# Patient Record
Sex: FEMALE | Race: WHITE | NOT HISPANIC OR LATINO | Employment: STUDENT | URBAN - METROPOLITAN AREA
[De-identification: names, ages, dates, MRNs, and addresses within clinical notes are randomized per-mention and may not be internally consistent; named-entity substitution may affect disease eponyms.]

---
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
## 2024-12-19 ENCOUNTER — PATIENT MESSAGE (OUTPATIENT)
Dept: INTERNAL MEDICINE | Facility: CLINIC | Age: 19
End: 2024-12-19

## 2024-12-19 ENCOUNTER — OFFICE VISIT (OUTPATIENT)
Dept: INTERNAL MEDICINE | Facility: CLINIC | Age: 19
End: 2024-12-19
Payer: COMMERCIAL

## 2024-12-19 ENCOUNTER — LAB VISIT (OUTPATIENT)
Dept: LAB | Facility: OTHER | Age: 19
End: 2024-12-19
Attending: FAMILY MEDICINE
Payer: COMMERCIAL

## 2024-12-19 VITALS
SYSTOLIC BLOOD PRESSURE: 120 MMHG | HEIGHT: 65 IN | OXYGEN SATURATION: 98 % | HEART RATE: 63 BPM | BODY MASS INDEX: 27.25 KG/M2 | DIASTOLIC BLOOD PRESSURE: 70 MMHG | WEIGHT: 163.56 LBS

## 2024-12-19 DIAGNOSIS — Z00.00 ANNUAL PHYSICAL EXAM: Primary | ICD-10-CM

## 2024-12-19 DIAGNOSIS — R61 EXCESSIVE SWEATING: ICD-10-CM

## 2024-12-19 DIAGNOSIS — Z00.00 ANNUAL PHYSICAL EXAM: ICD-10-CM

## 2024-12-19 LAB
ALBUMIN SERPL BCP-MCNC: 4.3 G/DL (ref 3.5–5.2)
ALP SERPL-CCNC: 52 U/L (ref 40–150)
ALT SERPL W/O P-5'-P-CCNC: 26 U/L (ref 10–44)
ANION GAP SERPL CALC-SCNC: 7 MMOL/L (ref 8–16)
AST SERPL-CCNC: 20 U/L (ref 10–40)
BASOPHILS # BLD AUTO: 0.06 K/UL (ref 0–0.2)
BASOPHILS NFR BLD: 0.7 % (ref 0–1.9)
BILIRUB SERPL-MCNC: 0.5 MG/DL (ref 0.1–1)
BUN SERPL-MCNC: 9 MG/DL (ref 6–20)
CALCIUM SERPL-MCNC: 9.6 MG/DL (ref 8.7–10.5)
CHLORIDE SERPL-SCNC: 108 MMOL/L (ref 95–110)
CHOLEST SERPL-MCNC: 167 MG/DL (ref 120–199)
CHOLEST/HDLC SERPL: 3.3 {RATIO} (ref 2–5)
CO2 SERPL-SCNC: 23 MMOL/L (ref 23–29)
CREAT SERPL-MCNC: 1 MG/DL (ref 0.5–1.4)
DIFFERENTIAL METHOD BLD: ABNORMAL
EOSINOPHIL # BLD AUTO: 0.4 K/UL (ref 0–0.5)
EOSINOPHIL NFR BLD: 5.1 % (ref 0–8)
ERYTHROCYTE [DISTWIDTH] IN BLOOD BY AUTOMATED COUNT: 11.2 % (ref 11.5–14.5)
EST. GFR  (NO RACE VARIABLE): >60 ML/MIN/1.73 M^2
GLUCOSE SERPL-MCNC: 90 MG/DL (ref 70–110)
HCT VFR BLD AUTO: 40.3 % (ref 37–48.5)
HDLC SERPL-MCNC: 51 MG/DL (ref 40–75)
HDLC SERPL: 30.5 % (ref 20–50)
HGB BLD-MCNC: 13.8 G/DL (ref 12–16)
IMM GRANULOCYTES # BLD AUTO: 0.01 K/UL (ref 0–0.04)
IMM GRANULOCYTES NFR BLD AUTO: 0.1 % (ref 0–0.5)
LDLC SERPL CALC-MCNC: 102 MG/DL (ref 63–159)
LYMPHOCYTES # BLD AUTO: 4.1 K/UL (ref 1–4.8)
LYMPHOCYTES NFR BLD: 51 % (ref 18–48)
MCH RBC QN AUTO: 31.4 PG (ref 27–31)
MCHC RBC AUTO-ENTMCNC: 34.2 G/DL (ref 32–36)
MCV RBC AUTO: 92 FL (ref 82–98)
MONOCYTES # BLD AUTO: 0.5 K/UL (ref 0.3–1)
MONOCYTES NFR BLD: 6.1 % (ref 4–15)
NEUTROPHILS # BLD AUTO: 3 K/UL (ref 1.8–7.7)
NEUTROPHILS NFR BLD: 37 % (ref 38–73)
NONHDLC SERPL-MCNC: 116 MG/DL
NRBC BLD-RTO: 0 /100 WBC
PLATELET # BLD AUTO: 223 K/UL (ref 150–450)
PMV BLD AUTO: 10.5 FL (ref 9.2–12.9)
POTASSIUM SERPL-SCNC: 4.8 MMOL/L (ref 3.5–5.1)
PROT SERPL-MCNC: 7.2 G/DL (ref 6–8.4)
RBC # BLD AUTO: 4.4 M/UL (ref 4–5.4)
SODIUM SERPL-SCNC: 138 MMOL/L (ref 136–145)
T4 FREE SERPL-MCNC: 0.92 NG/DL (ref 0.71–1.51)
TRIGL SERPL-MCNC: 70 MG/DL (ref 30–150)
TSH SERPL DL<=0.005 MIU/L-ACNC: 1.42 UIU/ML (ref 0.4–4)
WBC # BLD AUTO: 8.08 K/UL (ref 3.9–12.7)

## 2024-12-19 PROCEDURE — 80061 LIPID PANEL: CPT | Performed by: FAMILY MEDICINE

## 2024-12-19 PROCEDURE — 84439 ASSAY OF FREE THYROXINE: CPT | Performed by: FAMILY MEDICINE

## 2024-12-19 PROCEDURE — 36415 COLL VENOUS BLD VENIPUNCTURE: CPT | Performed by: FAMILY MEDICINE

## 2024-12-19 PROCEDURE — 99999 PR PBB SHADOW E&M-EST. PATIENT-LVL III: CPT | Mod: PBBFAC,,, | Performed by: FAMILY MEDICINE

## 2024-12-19 PROCEDURE — 85025 COMPLETE CBC W/AUTO DIFF WBC: CPT | Performed by: FAMILY MEDICINE

## 2024-12-19 PROCEDURE — 80053 COMPREHEN METABOLIC PANEL: CPT | Performed by: FAMILY MEDICINE

## 2024-12-19 PROCEDURE — 84443 ASSAY THYROID STIM HORMONE: CPT | Performed by: FAMILY MEDICINE

## 2024-12-19 RX ORDER — NORETHINDRONE ACETATE AND ETHINYL ESTRADIOL 1MG-20(21)
1 KIT ORAL DAILY
COMMUNITY

## 2024-12-19 NOTE — PROGRESS NOTES
Subjective:      Patient ID: Sarah Hooper is a 19 y.o. female.    Chief Complaint: Thyroid Problem and Est Care (Not sleeping )    History of Present Illness    CHIEF COMPLAINT:  - Ms. Hooper presents for an annual physical exam and to discuss concerns about possible thyroid issues.    HPI:  Ms. Hooper reports increased sweating over the past 3-4 weeks, occurring throughout the day, even in comfortable temperatures. She describes an incident of profuse sweating while in an air-conditioned vehicle. Ms. Hooper also has night sweats. These episodes are not associated with feeling hot, tachycardia, flushing, or anxiety.    Ms. Hooper has difficulty falling asleep and maintaining sleep, often requiring melatonin to initiate sleep. She has frequent nocturnal awakenings.    Ms. Hooper reports chronic mild constipation. She denies recent changes in weight, hair thinning, or eyebrow thinning. Ms. Hooper also denies depression or increased anxiety recently.    Ms. Hooper reports generally regular menstrual periods. She notes an absence of menstruation during the placebo week of her oral contraceptive 2 months ago. Her most recent menstrual period occurred as expected. She is currently on oral contraceptives (Blisabi ) taken consistently for 3 weeks followed by a 1-week break for menstruation. She has not recently altered her contraceptive regimen.    Ms. Hooper denies cough, fever, mood changes, or any recent illnesses. She denies having been diagnosed with any medical issues, including asthma and cardiac conditions.    She's in nursing school; she had a TB test (routine for work) that was negative in Aug 2025.    MEDICAL HISTORY:  - Contraception: Current oral birth control (Blisabi F.E. 120)  - Sexually active: Yes  - Pregnancy status: Denies possibility of pregnancy  - Flu shot received in September 2023    FAMILY HISTORY:  - Mother: History of cancer (Hodgkin's or non-Hodgkin's lymphoma),  hypothyroidism  - Father: History of cancer (Hodgkin's or non-Hodgkin's lymphoma), hypertension    SOCIAL HISTORY:  - Alcohol Use: Rarely, a few times per year  - Smoking: No  - Chewing tobacco: No  - Illicit drugs: No  - Occupation: Nursing student         There is no problem list on file for this patient.         Current Outpatient Medications:     norethindrone-ethinyl estradiol (BLISOVI FE 1/20, 28,) 1 mg-20 mcg (21)/75 mg (7) per tablet, Take 1 tablet by mouth once daily., Disp: , Rfl:     History reviewed. No pertinent surgical history.     Family History   Problem Relation Name Age of Onset    Other (blood cancer) Mother      Hypothyroidism Mother      Hypertension Father      Other (blood cancer) Father      Breast cancer Neg Hx      Colon cancer Neg Hx      Ovarian cancer Neg Hx          Social History     Socioeconomic History    Marital status: Single   Occupational History    Occupation: nursing student   Tobacco Use    Smoking status: Never    Smokeless tobacco: Never   Substance and Sexual Activity    Alcohol use: No     Comment: rarely    Drug use: No    Sexual activity: Never     Partners: Male     Social Drivers of Health     Financial Resource Strain: Low Risk  (12/17/2024)    Overall Financial Resource Strain (CARDIA)     Difficulty of Paying Living Expenses: Not hard at all   Food Insecurity: No Food Insecurity (12/17/2024)    Hunger Vital Sign     Worried About Running Out of Food in the Last Year: Never true     Ran Out of Food in the Last Year: Never true   Physical Activity: Sufficiently Active (12/17/2024)    Exercise Vital Sign     Days of Exercise per Week: 4 days     Minutes of Exercise per Session: 60 min   Stress: No Stress Concern Present (12/17/2024)    [employer] - Occupational Stress Questionnaire     Feeling of Stress : Only a little   Housing Stability: Unknown (12/17/2024)    Housing Stability Vital Sign     Unable to Pay for Housing in the Last  "Year: No        No results found for: "NA", "K", "CL", "CO2", "GLU", "BUN", "CREATININE", "CALCIUM", "PROT", "ALBUMIN", "BILITOT", "ALKPHOS", "AST", "ALT", "ANIONGAP", "ESTGFRAFRICA", "EGFRNONAA", "WBC", "HGB", "HCT", "MCV", "PLT", "TSH", "PSA", "PSADIAG", "HEPCAB"    No results found for: "HGBA1C"  No results found for: "MICALBCREAT"  No results found for: "LDLCALC", "CHOL", "HDL", "TRIG"    Review of Systems   Constitutional:  Positive for diaphoresis. Negative for appetite change, chills, fever and unexpected weight change.   HENT:  Negative for ear pain, sinus pressure/congestion and sore throat.    Eyes:  Negative for visual disturbance.   Respiratory:  Negative for shortness of breath and wheezing.    Cardiovascular:  Negative for chest pain, palpitations and leg swelling.   Gastrointestinal:  Negative for abdominal pain, blood in stool, change in bowel habit, constipation, diarrhea and vomiting.   Genitourinary:  Negative for dysuria and hematuria.   Musculoskeletal:  Negative for arthralgias.   Integumentary:  Negative for rash.   Neurological:  Negative for dizziness and headaches.   Psychiatric/Behavioral:  Negative for depressed mood and sleep disturbance.         Vitals:    12/19/24 0915   BP: 120/70   Pulse: 63   SpO2: 98%   Weight: 74.2 kg (163 lb 9.3 oz)   Height: 5' 5" (1.651 m)   PainSc: 0-No pain     Objective:   Physical Exam    General: Pleasant. No acute distress. Well-developed. Well-nourished.  Eyes: EOMBI. Sclerae anicteric. PERRLA.  HENT: Normocephalic. Atraumatic. Nares patent. Moist oral mucosa.  Ears: Bilateral TMs clear.  Cardiovascular: Regular rate and rhythm. No murmurs. No gallops. No rubs. Normal S1 and S2.  Respiratory: Normal respiratory effort. Clear to auscultation bilaterally. No rales. No rhonchi. No wheezing.  Abdomen: Soft. Nontender. Nondistended. Normoactive bowel sounds.  Musculoskeletal: No obvious deformity. Normal range of motion.  Extremities: No lower extremity " edema.  Neurological: Alert and lucid. Normal gait. No gross deficits.  Psychiatric: Normal mood. Normal affect. Good insight. Good judgment.  Skin: Warm. Intact. No rash. No wound.        Assessment/Plan       ICD-10-CM ICD-9-CM   1. Annual physical exam  Z00.00 V70.0   2. Excessive sweating  R61 780.8        Assessment & Plan     Evaluated reported symptoms of increased sweating and sleep disturbances   Considered thyroid dysfunction as potential cause   Ruled out recent illness, fever, or changes in birth control as contributing factors   Assessed for other potential causes of night sweats and sleep issues   Planned to review labs results before considering further interventions or medications for excessive sweating    GENERAL ADULT MEDICAL EXAMINATION:  - Ordered CBC, CMP, thyroid panel, blood sugar, and cholesterol as comprehensive blood panel.  - Explained that thyroid issue symptoms can be vague and non-specific.    LONG TERM USE OF HORMONAL CONTRACEPTIVES:  - Discussed that light or absent periods can occur with long-term birth control pill use.  - Continued Blisovi FE 1/20 oral contraceptive.    ENCOUNTER FOR SCREENING FOR OTHER SUSPECTED ENDOCRINE DISORDER:  - Ordered CBC, CMP, thyroid panel, blood sugar, and cholesterol as comprehensive blood panel.    SLEEP DISORDER:  - Ms. Hooper to consider taking melatonin nightly to establish regular sleep pattern.    IMMUNIZATION:  - Educated on annual COVID vaccination recommendation, similar to influenza immunizations.         Annual physical exam  -     Lipid Panel; Future  -     Comprehensive Metabolic Panel; Future  -     CBC Auto Differential; Future  -     TSH; Future  -     T4, Free; Future    Excessive sweating           Chronic conditions status updated as per HPI.  Other than changes above, cont current medications and maintain follow up with specialists.      Follow up if symptoms worsen or fail to improve.         Perla Husain MD  [facility]    This note was generated with the assistance of ambient listening technology. Verbal consent was obtained by the patient and accompanying visitor(s) for the recording of patient appointment to facilitate this note. I attest to having reviewed and edited the generated note for accuracy, though some syntax or spelling errors may persist. Please contact the author of this note for any clarification.       There are no Patient Instructions on file for this visit.  Tests to Keep You Healthy    Cervical Cancer Screening: DUE      Immunization History   Administered Date(s) Administered    COVID-19, MRNA, LN-S, PF (Pfizer) (Gray Cap) 07/11/2022    COVID-19, MRNA, LN-S, PF (Pfizer) (Purple Cap) 09/21/2021    DTaP 2003, 2003, 2003, 11/12/2004, 02/19/2007    HIB 2003, 2003, 2003, 02/20/2004    HPV 9-Valent 01/12/2018, 02/13/2019    Hepatitis A, Pediatric/Adolescent, 2 Dose 02/19/2007, 02/19/2020    Hepatitis B, Pediatric/Adolescent 2003, 2003, 2003, 10/15/2022    IPV 2003, 2003, 11/12/2004, 02/19/2007    Influenza - Quadrivalent - MDCK - PF 08/23/2023    MMR 02/20/2004, 02/19/2007    Meningococcal Conjugate (MCV4O) 1 Vial Dose(10yr-55yr) 07/24/2014, 02/19/2020    Pneumococcal Conjugate - 7 Valent 2003, 2003, 2003    Tdap 07/24/2014, 07/22/2024    Varicella 02/20/2004, 04/01/2015, 07/28/2022, 10/15/2022

## 2025-05-27 ENCOUNTER — OFFICE VISIT (OUTPATIENT)
Dept: GASTROENTEROLOGY | Facility: CLINIC | Age: 20
End: 2025-05-27
Payer: COMMERCIAL

## 2025-05-27 VITALS — WEIGHT: 158.06 LBS | BODY MASS INDEX: 26.3 KG/M2

## 2025-05-27 DIAGNOSIS — K59.04 CHRONIC IDIOPATHIC CONSTIPATION: Primary | ICD-10-CM

## 2025-05-27 PROCEDURE — 3008F BODY MASS INDEX DOCD: CPT | Mod: CPTII,S$GLB,,

## 2025-05-27 PROCEDURE — 1159F MED LIST DOCD IN RCRD: CPT | Mod: CPTII,S$GLB,,

## 2025-05-27 PROCEDURE — 99203 OFFICE O/P NEW LOW 30 MIN: CPT | Mod: S$GLB,,,

## 2025-05-27 PROCEDURE — 99999 PR PBB SHADOW E&M-EST. PATIENT-LVL III: CPT | Mod: PBBFAC,,,

## 2025-05-27 NOTE — PROGRESS NOTES
GASTROENTEROLOGY CLINIC NOTE    Reason for visit: The encounter diagnosis was Chronic idiopathic constipation.  Referring provider/PCP: Perla Husain MD    HPI:  Sarah Hooper is a 20 y.o. female here today for constipation. She reports being constipated all her life. She feels she wont have BM unless taking laxative. Usually senna or ducolax. Causes her to have BM-- not straining and feeling like she is emptying, no abd pain. No GI FH.    Prior Endoscopy:  EGD:  Colon:    (Portions of this note were dictated using voice recognition software and may contain dictation related errors in spelling/grammar/syntax not found on text review)    Review of Systems   Gastrointestinal:  Positive for constipation. Negative for abdominal pain.       Past Medical History: has no past medical history on file.    Past Surgical History: has no past surgical history on file.    Home medications: Medications Ordered Prior to Encounter[1]    Vital signs:  Wt 71.7 kg (158 lb 1.1 oz)   BMI 26.30 kg/m²     Physical Exam  Constitutional:       Appearance: Normal appearance. She is normal weight.   Abdominal:      General: Abdomen is flat. There is no distension.   Neurological:      Mental Status: She is alert.       I have reviewed associated labs, imaging and notes.     Assessment:  1. Chronic idiopathic constipation      Trial of miralax daily-- titrate as needed. Consider low dose linzess if no relief.    Plan:     Start taking miralax daily   Increase/decrease as needed    [facility]  [place]         [1]   Current Outpatient Medications on File Prior to Visit   Medication Sig Dispense Refill    norethindrone-ethinyl estradiol (BLISOVI FE 1/20, 28,) 1 mg-20 mcg (21)/75 mg (7) per tablet Take 1 tablet by mouth once daily.       No current facility-administered medications on file prior to visit.

## 2025-05-27 NOTE — PATIENT INSTRUCTIONS
- start taking 1 capful miralax daily   - mix in about 6-8oz of water/tea/juice/coffee   - you can increase to twice daily or decrease to every other day pending BM function    - please send me an update in a couple weeks   - let me know how your BM's are and if miralax is helping

## 2025-07-24 ENCOUNTER — LAB VISIT (OUTPATIENT)
Dept: LAB | Facility: OTHER | Age: 20
End: 2025-07-24
Attending: INTERNAL MEDICINE
Payer: COMMERCIAL

## 2025-07-24 DIAGNOSIS — K58.1 IRRITABLE BOWEL SYNDROME WITH CONSTIPATION: Primary | ICD-10-CM

## 2025-07-24 LAB
ABSOLUTE EOSINOPHIL (OHS): 0.24 K/UL
ABSOLUTE MONOCYTE (OHS): 0.61 K/UL (ref 0.3–1)
ABSOLUTE NEUTROPHIL COUNT (OHS): 3.07 K/UL (ref 1.8–7.7)
ALBUMIN SERPL BCP-MCNC: 4.1 G/DL (ref 3.5–5.2)
ALP SERPL-CCNC: 56 UNIT/L (ref 40–150)
ALT SERPL W/O P-5'-P-CCNC: 26 UNIT/L (ref 10–44)
ANION GAP (OHS): 5 MMOL/L (ref 8–16)
AST SERPL-CCNC: 24 UNIT/L (ref 11–45)
BASOPHILS # BLD AUTO: 0.07 K/UL
BASOPHILS NFR BLD AUTO: 0.9 %
BILIRUB SERPL-MCNC: 0.4 MG/DL (ref 0.1–1)
BUN SERPL-MCNC: 11 MG/DL (ref 6–20)
CALCIUM SERPL-MCNC: 9.1 MG/DL (ref 8.7–10.5)
CHLORIDE SERPL-SCNC: 108 MMOL/L (ref 95–110)
CO2 SERPL-SCNC: 28 MMOL/L (ref 23–29)
CREAT SERPL-MCNC: 0.9 MG/DL (ref 0.5–1.4)
CRP SERPL-MCNC: 7.7 MG/L
ERYTHROCYTE [DISTWIDTH] IN BLOOD BY AUTOMATED COUNT: 11.2 % (ref 11.5–14.5)
GFR SERPLBLD CREATININE-BSD FMLA CKD-EPI: >60 ML/MIN/1.73/M2
GLUCOSE SERPL-MCNC: 78 MG/DL (ref 70–110)
HCT VFR BLD AUTO: 41.1 % (ref 37–48.5)
HGB BLD-MCNC: 13.5 GM/DL (ref 12–16)
IMM GRANULOCYTES # BLD AUTO: 0.01 K/UL (ref 0–0.04)
IMM GRANULOCYTES NFR BLD AUTO: 0.1 % (ref 0–0.5)
LYMPHOCYTES # BLD AUTO: 3.76 K/UL (ref 1–4.8)
MCH RBC QN AUTO: 30.8 PG (ref 27–31)
MCHC RBC AUTO-ENTMCNC: 32.8 G/DL (ref 32–36)
MCV RBC AUTO: 94 FL (ref 82–98)
NUCLEATED RBC (/100WBC) (OHS): 0 /100 WBC
PLATELET # BLD AUTO: 239 K/UL (ref 150–450)
PMV BLD AUTO: 10.9 FL (ref 9.2–12.9)
POTASSIUM SERPL-SCNC: 4.4 MMOL/L (ref 3.5–5.1)
PROT SERPL-MCNC: 6.9 GM/DL (ref 6–8.4)
RBC # BLD AUTO: 4.39 M/UL (ref 4–5.4)
RELATIVE EOSINOPHIL (OHS): 3.1 %
RELATIVE LYMPHOCYTE (OHS): 48.5 % (ref 18–48)
RELATIVE MONOCYTE (OHS): 7.9 % (ref 4–15)
RELATIVE NEUTROPHIL (OHS): 39.5 % (ref 38–73)
SODIUM SERPL-SCNC: 141 MMOL/L (ref 136–145)
WBC # BLD AUTO: 7.76 K/UL (ref 3.9–12.7)

## 2025-07-24 PROCEDURE — 86364 TISS TRNSGLTMNASE EA IG CLAS: CPT

## 2025-07-24 PROCEDURE — 85025 COMPLETE CBC W/AUTO DIFF WBC: CPT

## 2025-07-24 PROCEDURE — 36415 COLL VENOUS BLD VENIPUNCTURE: CPT

## 2025-07-24 PROCEDURE — 80053 COMPREHEN METABOLIC PANEL: CPT

## 2025-07-24 PROCEDURE — 86140 C-REACTIVE PROTEIN: CPT

## 2025-07-28 LAB
W GLIADIN AB IGA, DEAMIDATED: <0.2 U/ML
W GLIADIN AB IGG, DEAMIDATED: <0.6 U/ML
W IMMUNOGLOBULIN A (IGA): 48 MG/DL
W TISSUE TRANSGLUTAMINASE IGA AB: <0.2 U/ML
W TISSUE TRANSGLUTAMINASE IGG: <0.6 U/ML